# Patient Record
Sex: MALE | Race: BLACK OR AFRICAN AMERICAN
[De-identification: names, ages, dates, MRNs, and addresses within clinical notes are randomized per-mention and may not be internally consistent; named-entity substitution may affect disease eponyms.]

---

## 2021-12-05 ENCOUNTER — HOSPITAL ENCOUNTER (EMERGENCY)
Dept: HOSPITAL 96 - M.ERS | Age: 66
Discharge: HOME | End: 2021-12-05
Payer: COMMERCIAL

## 2021-12-05 VITALS — SYSTOLIC BLOOD PRESSURE: 139 MMHG | DIASTOLIC BLOOD PRESSURE: 93 MMHG

## 2021-12-05 VITALS — BODY MASS INDEX: 25.9 KG/M2 | HEIGHT: 67 IN | WEIGHT: 164.99 LBS

## 2021-12-05 DIAGNOSIS — S22.32XA: Primary | ICD-10-CM
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
## 2021-12-05 DIAGNOSIS — F17.210: ICD-10-CM
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
## 2021-12-05 LAB
ABSOLUTE BASOPHILS: 0 THOU/UL (ref 0–0.2)
ABSOLUTE EOSINOPHILS: 0.2 THOU/UL (ref 0–0.7)
ABSOLUTE MONOCYTES: 0.3 THOU/UL (ref 0–1.2)
ALBUMIN SERPL-MCNC: 3.6 G/DL (ref 3.4–5)
ALP SERPL-CCNC: 58 U/L (ref 46–116)
ALT SERPL-CCNC: 17 U/L (ref 30–65)
ANION GAP SERPL CALC-SCNC: 3 MMOL/L (ref 7–16)
AST SERPL-CCNC: 10 U/L (ref 15–37)
BASOPHILS NFR BLD AUTO: 0.6 %
BILIRUB SERPL-MCNC: 0.4 MG/DL
BUN SERPL-MCNC: 10 MG/DL (ref 7–18)
CALCIUM SERPL-MCNC: 9 MG/DL (ref 8.5–10.1)
CHLORIDE SERPL-SCNC: 103 MMOL/L (ref 98–107)
CO2 SERPL-SCNC: 33 MMOL/L (ref 21–32)
CREAT SERPL-MCNC: 1.2 MG/DL (ref 0.6–1.3)
EOSINOPHIL NFR BLD: 7 %
GLUCOSE SERPL-MCNC: 88 MG/DL (ref 70–99)
GRANULOCYTES NFR BLD MANUAL: 50.2 %
HCT VFR BLD CALC: 44.8 % (ref 42–52)
HGB BLD-MCNC: 14.8 GM/DL (ref 14–18)
LYMPHOCYTES # BLD: 0.9 THOU/UL (ref 0.8–5.3)
LYMPHOCYTES NFR BLD AUTO: 32.6 %
MCH RBC QN AUTO: 31.2 PG (ref 26–34)
MCHC RBC AUTO-ENTMCNC: 33.1 G/DL (ref 28–37)
MCV RBC: 94.3 FL (ref 80–100)
MONOCYTES NFR BLD: 9.6 %
MPV: 6.1 FL. (ref 7.2–11.1)
NEUTROPHILS # BLD: 1.5 THOU/UL (ref 1.6–8.1)
NUCLEATED RBCS: 0 /100WBC
PLATELET COUNT*: 271 THOU/UL (ref 150–400)
POTASSIUM SERPL-SCNC: 4.3 MMOL/L (ref 3.5–5.1)
PROT SERPL-MCNC: 7.4 G/DL (ref 6.4–8.2)
RBC # BLD AUTO: 4.75 MIL/UL (ref 4.5–6)
RDW-CV: 15.3 % (ref 10.5–14.5)
SODIUM SERPL-SCNC: 139 MMOL/L (ref 136–145)
WBC # BLD AUTO: 2.9 THOU/UL (ref 4–11)

## 2021-12-05 NOTE — EKG
[place] [address]
Phone:  [phone]                     ELECTROCARDIOGRAM REPORT      
_______________________________________________________________________________
 
Name:         JAYDE STOVALL              Room:                     [facility]#:    [id number]     Account #:     [id number]  
Admission:    21    Attend Phys:                     
Discharge:    21    Date of Birth: [birth year]  
Date of Service: 21 [facility]  Report #:      [id number]
        [id number]
_______________________________________________________________________________
THIS REPORT FOR:  //name//                      
 
                         [facility] ED
                                       
Test Date:    2021               Test Time:    10:24:51
Pat Name:     JAYDE STOVALL           Department:   
Patient ID:   [id number]            Room:          
Gender:                               Technician:   
:          1955               Requested By: Bill Mills
Order Number: [id number] MD:   Hunter Colón
                                 Measurements
Intervals                              Axis          
Rate:         62                       P:            44
DE:           194                      QRS:          46
QRSD:         91                       T:            27
QT:           411                                    
QTc:          418                                    
                           Interpretive Statements
Sinus rhythm
Probable left atrial enlargement
Abnormal R-wave progression, early transition
ST elevation, consider early repolarization
no previous ECG available for comparison
Electronically Signed On 2021 13:57:27 CST by Hunter Colón
[email or web address]
 
 
 
 
 
 
 
 
 
 
 
 
 
 
 
 
 
 
 
  <ELECTRONICALLY SIGNED>
                                           By: Hunter Colón MD, [facility]   
  21     1357
D: 21 1024   _____________________________________
T: 21 1024   Hunter Colón MD, [facility]     /EPI